# Patient Record
Sex: FEMALE | Race: WHITE | NOT HISPANIC OR LATINO | URBAN - METROPOLITAN AREA
[De-identification: names, ages, dates, MRNs, and addresses within clinical notes are randomized per-mention and may not be internally consistent; named-entity substitution may affect disease eponyms.]

---

## 2021-10-28 ENCOUNTER — OUTPATIENT (OUTPATIENT)
Dept: OUTPATIENT SERVICES | Facility: HOSPITAL | Age: 41
LOS: 1 days | Discharge: HOME | End: 2021-10-28

## 2021-10-28 DIAGNOSIS — S89.92XA UNSPECIFIED INJURY OF LEFT LOWER LEG, INITIAL ENCOUNTER: Chronic | ICD-10-CM

## 2021-10-28 DIAGNOSIS — K42.9 UMBILICAL HERNIA WITHOUT OBSTRUCTION OR GANGRENE: Chronic | ICD-10-CM

## 2021-10-28 DIAGNOSIS — Z90.49 ACQUIRED ABSENCE OF OTHER SPECIFIED PARTS OF DIGESTIVE TRACT: Chronic | ICD-10-CM

## 2021-10-28 DIAGNOSIS — C50.919 MALIGNANT NEOPLASM OF UNSPECIFIED SITE OF UNSPECIFIED FEMALE BREAST: ICD-10-CM

## 2021-10-28 DIAGNOSIS — Z98.89 OTHER SPECIFIED POSTPROCEDURAL STATES: Chronic | ICD-10-CM

## 2021-10-28 DIAGNOSIS — Z45.819 ENCOUNTER FOR ADJUSTMENT OR REMOVAL OF UNSPECIFIED BREAST IMPLANT: Chronic | ICD-10-CM

## 2021-11-01 ENCOUNTER — APPOINTMENT (OUTPATIENT)
Dept: HEMATOLOGY ONCOLOGY | Facility: CLINIC | Age: 41
End: 2021-11-01
Payer: COMMERCIAL

## 2021-11-10 ENCOUNTER — APPOINTMENT (OUTPATIENT)
Dept: HEMATOLOGY ONCOLOGY | Facility: CLINIC | Age: 41
End: 2021-11-10
Payer: COMMERCIAL

## 2021-11-10 VITALS
SYSTOLIC BLOOD PRESSURE: 117 MMHG | WEIGHT: 157 LBS | HEART RATE: 79 BPM | BODY MASS INDEX: 26.16 KG/M2 | HEIGHT: 65 IN | DIASTOLIC BLOOD PRESSURE: 81 MMHG | OXYGEN SATURATION: 97 %

## 2021-11-10 PROCEDURE — 99205 OFFICE O/P NEW HI 60 MIN: CPT

## 2021-11-10 RX ORDER — LEVOTHYROXINE SODIUM 200 UG/1
200 TABLET ORAL
Qty: 90 | Refills: 1 | Status: ACTIVE | COMMUNITY
Start: 2021-11-10

## 2021-11-10 RX ORDER — DULOXETINE HYDROCHLORIDE 60 MG/1
60 CAPSULE, DELAYED RELEASE PELLETS ORAL
Refills: 0 | Status: ACTIVE | COMMUNITY
Start: 2021-11-10

## 2021-11-10 RX ORDER — LEVOTHYROXINE SODIUM 25 UG/1
25 TABLET ORAL DAILY
Refills: 0 | Status: ACTIVE | COMMUNITY
Start: 2021-11-10

## 2021-11-10 RX ORDER — DENOSUMAB 60 MG/ML
60 INJECTION SUBCUTANEOUS
Refills: 0 | Status: ACTIVE | COMMUNITY
Start: 2021-11-10

## 2021-11-10 NOTE — ASSESSMENT
[FreeTextEntry1] : 40 year old female history of stage IA right breast cancer (pTcN0, ER 90% IN 80% Her2 negative) s/p bilateral mastectomy on 11/8/2013.  Started Tamoxifen in 2013, stopped taking Tamoxifen in 2017 due to side effects of emotional lability, total duration of Tamoxifen ~3 years. \par \par Today we reviewed her available radiographic and pathologic data in detail. We also discussed the natural history of the disease. She has fibromyalgia and has ongoing chronic MSK pain, no particular area that is worse than others.   She does have some intermittent pelvic pressure.   Discussed no role for routine imaging for surveillance as it has not shown survival benefit. \par \par Plan:\par Advised imaging would be symptom driven \par Pelvic pain: f/u with GYN\par Follow up in 6 months

## 2021-11-10 NOTE — CONSULT LETTER
[Dear  ___] : Dear  [unfilled], [Consult Letter:] : I had the pleasure of evaluating your patient, [unfilled]. [Please see my note below.] : Please see my note below. [Consult Closing:] : Thank you very much for allowing me to participate in the care of this patient.  If you have any questions, please do not hesitate to contact me. [Sincerely,] : Sincerely, [DrYusef  ___] : Dr. PEDERSEN [FreeTextEntry3] : Sedrick Herman MD\par Medical Oncology/Hematology\par [facility] [place], [facility]\par \par \par [facility] at [facility]\par

## 2021-11-10 NOTE — HISTORY OF PRESENT ILLNESS
[Disease: _____________________] : Disease: [unfilled] [T: ___] : T[unfilled] [N: ___] : N[unfilled] [AJCC Stage: ____] : AJCC Stage: [unfilled] [de-identified] : Ms. Stanley is a 40 year old female who was diagnosed with right breast IDC ER 90% GA 80% Her2 negative in 2013 at age 32.\par \par Patient experienced right breast pain which prompted breast imaging demonstrating right breast mass. Underwent bilateral mastectomy on 11/8/2013 which demonstrated right breast IDC, grade 1, measuring 1.1cm in greatest dimension ER 90% GA 80% Her2 negative. IDC is 2cm from the closest margin of excision. No lymphovascular space invasion is present. DCIS, low nuclear grade associated with calcifications but no necrosis. 0/1 lymph nodes negative. pT1cN0\par \par Previous oncologist was Dr. Sellers at [facility] then f/u with Dr. Cherelle Hess then stopped following with oncology altogether. \par Recalls genetic testing at [facility] in 2013 did not reveal any genetic mutations \par Started Tamoxifen immediately following b/l mastectomy in 2013, stopped taking Tamoxifen in 2017 due to side effects of emotional lability and outbursts\par Notes right femur fracture in 2014 requiring surgery\par Reports she has fibromyalgia. Has intermittent episodes of bilateral clavicle, neck, bilateral hips or bilateral pelvic pain radiating to ankles. Notes pain is worse after hiking, receives massage therapy and f/u with chiropractor \par Has intermittent pelvic pain, pressure stabbing like pain, ranges from mild- severe, self resolves\par Has full ROM of arms. \par Menstrual cycle is regular, last GYN exam 2019\par No recent weight loss\par Last colonoscopy was 9 years ago. Underwent recent EGD due to GERD in [place], cannot recall physicians name\par \par PMH: Hypothyroidism (born without thyroid gland), Osteopenia (DEXA on 11/2/2015), Fibromyalgia\par SH: Bilateral mastectomy (2013), Right femur fracture repair\par FH: Denies family history of cancer\par PCP: Dr. Farshad Baum  [de-identified] : IDC, grade 1 [de-identified] : ER 90% WA 80% Her2 negative

## 2021-11-10 NOTE — PHYSICAL EXAM
[Normal] : affect appropriate [de-identified] : supple [de-identified] : bilateral reconstructed breasts

## 2021-11-16 ENCOUNTER — RESULT REVIEW (OUTPATIENT)
Age: 41
End: 2021-11-16

## 2021-11-17 LAB — SURGICAL PATHOLOGY STUDY: SIGNIFICANT CHANGE UP

## 2022-05-04 ENCOUNTER — OUTPATIENT (OUTPATIENT)
Dept: OUTPATIENT SERVICES | Facility: HOSPITAL | Age: 42
LOS: 1 days | Discharge: HOME | End: 2022-05-04

## 2022-05-04 DIAGNOSIS — K42.9 UMBILICAL HERNIA WITHOUT OBSTRUCTION OR GANGRENE: Chronic | ICD-10-CM

## 2022-05-04 DIAGNOSIS — S89.92XA UNSPECIFIED INJURY OF LEFT LOWER LEG, INITIAL ENCOUNTER: Chronic | ICD-10-CM

## 2022-05-04 DIAGNOSIS — C50.919 MALIGNANT NEOPLASM OF UNSPECIFIED SITE OF UNSPECIFIED FEMALE BREAST: ICD-10-CM

## 2022-05-04 DIAGNOSIS — Z98.89 OTHER SPECIFIED POSTPROCEDURAL STATES: Chronic | ICD-10-CM

## 2022-05-04 DIAGNOSIS — Z90.49 ACQUIRED ABSENCE OF OTHER SPECIFIED PARTS OF DIGESTIVE TRACT: Chronic | ICD-10-CM

## 2022-05-04 DIAGNOSIS — Z45.819 ENCOUNTER FOR ADJUSTMENT OR REMOVAL OF UNSPECIFIED BREAST IMPLANT: Chronic | ICD-10-CM

## 2022-05-10 ENCOUNTER — APPOINTMENT (OUTPATIENT)
Dept: HEMATOLOGY ONCOLOGY | Facility: CLINIC | Age: 42
End: 2022-05-10
Payer: COMMERCIAL

## 2022-05-10 VITALS
SYSTOLIC BLOOD PRESSURE: 107 MMHG | BODY MASS INDEX: 26.41 KG/M2 | DIASTOLIC BLOOD PRESSURE: 75 MMHG | HEART RATE: 80 BPM | OXYGEN SATURATION: 96 % | WEIGHT: 158.5 LBS | HEIGHT: 65 IN

## 2022-05-10 DIAGNOSIS — C50.919 MALIGNANT NEOPLASM OF UNSPECIFIED SITE OF UNSPECIFIED FEMALE BREAST: ICD-10-CM

## 2022-05-10 PROCEDURE — 99214 OFFICE O/P EST MOD 30 MIN: CPT

## 2022-05-10 RX ORDER — GABAPENTIN 300 MG/1
300 CAPSULE ORAL
Refills: 0 | Status: ACTIVE | COMMUNITY

## 2022-05-10 NOTE — ADDENDUM
[FreeTextEntry1] : Documented by Christopher Kennedy acting as scribe for Dr. Herman on 05/10/2022. \par \par All Medical record entries made by the Scribe were at my, Dr. Herman's, direction and personally dictated by me on 05/10/2022. I have reviewed the chart and agree that the record accurately reflects my personal performance of the history, physical exam, assessment and plan. I have also personally directed, reviewed, and agreed with the discharge instructions.

## 2022-05-10 NOTE — CONSULT LETTER
[Dear  ___] : Dear  [unfilled], [Consult Letter:] : I had the pleasure of evaluating your patient, [unfilled]. [Please see my note below.] : Please see my note below. [Consult Closing:] : Thank you very much for allowing me to participate in the care of this patient.  If you have any questions, please do not hesitate to contact me. [Sincerely,] : Sincerely, [DrYusef  ___] : Dr. PEDERSEN [FreeTextEntry3] : Sedrick Herman MD\par Medical Oncology/Hematology\par [facility] [place], [facility]\par \par \par [facility] at [facility], [place], operated by [facility]\par

## 2022-05-10 NOTE — HISTORY OF PRESENT ILLNESS
[Disease: _____________________] : Disease: [unfilled] [T: ___] : T[unfilled] [N: ___] : N[unfilled] [AJCC Stage: ____] : AJCC Stage: [unfilled] [de-identified] : Ms. Stanley is a 40 year old female who was diagnosed with right breast IDC ER 90% MD 80% Her2 negative in 2013 at age 32.\par \par Patient experienced right breast pain which prompted breast imaging demonstrating right breast mass. Underwent bilateral mastectomy on 11/8/2013 which demonstrated right breast IDC, grade 1, measuring 1.1cm in greatest dimension ER 90% MD 80% Her2 negative. IDC is 2cm from the closest margin of excision. No lymphovascular space invasion is present. DCIS, low nuclear grade associated with calcifications but no necrosis. 0/1 lymph nodes negative. pT1cN0\par \par Previous oncologist was Dr. Sellers at [facility] then f/u with Dr. Cherelle Hess then stopped following with oncology altogether. \par Recalls genetic testing at [facility] in 2013 did not reveal any genetic mutations \par Started Tamoxifen immediately following b/l mastectomy in 2013, stopped taking Tamoxifen in 2017 due to side effects of emotional lability and outbursts\par Notes right femur fracture in 2014 requiring surgery\par Reports she has fibromyalgia. Has intermittent episodes of bilateral clavicle, neck, bilateral hips or bilateral pelvic pain radiating to ankles. Notes pain is worse after hiking, receives massage therapy and f/u with chiropractor \par Has intermittent pelvic pain, pressure stabbing like pain, ranges from mild- severe, self resolves\par Has full ROM of arms. \par Menstrual cycle is regular, last GYN exam 2019\par No recent weight loss\par Last colonoscopy was 9 years ago. Underwent recent EGD due to GERD in [place], cannot recall physicians name\par \par PMH: Hypothyroidism (born without thyroid gland), Osteopenia (DEXA on 11/2/2015), Fibromyalgia\par SH: Bilateral mastectomy (2013), Right femur fracture repair\par FH: Denies family history of cancer\par PCP: Dr. Farshad Baum  [de-identified] : IDC, grade 1 [de-identified] : ER 90% IN 80% Her2 negative [de-identified] : Patient returns for follow-up. \par Stable weight \par No bone pain \par On Gabapentin as per Rheumatologist \par Regular periods. Last follow-up with GYN was last year. \par \par

## 2022-05-10 NOTE — PHYSICAL EXAM
[Normal] : affect appropriate [de-identified] : supple [de-identified] : bilateral reconstructed breasts, no masses or axillary adenopathy

## 2022-05-25 ENCOUNTER — APPOINTMENT (OUTPATIENT)
Dept: ORTHOPEDIC SURGERY | Facility: CLINIC | Age: 42
End: 2022-05-25

## 2022-11-03 ENCOUNTER — OUTPATIENT (OUTPATIENT)
Dept: OUTPATIENT SERVICES | Facility: HOSPITAL | Age: 42
LOS: 1 days | Discharge: HOME | End: 2022-11-03

## 2022-11-03 DIAGNOSIS — S89.92XA UNSPECIFIED INJURY OF LEFT LOWER LEG, INITIAL ENCOUNTER: Chronic | ICD-10-CM

## 2022-11-03 DIAGNOSIS — Z90.49 ACQUIRED ABSENCE OF OTHER SPECIFIED PARTS OF DIGESTIVE TRACT: Chronic | ICD-10-CM

## 2022-11-03 DIAGNOSIS — Z45.819 ENCOUNTER FOR ADJUSTMENT OR REMOVAL OF UNSPECIFIED BREAST IMPLANT: Chronic | ICD-10-CM

## 2022-11-03 DIAGNOSIS — C50.919 MALIGNANT NEOPLASM OF UNSPECIFIED SITE OF UNSPECIFIED FEMALE BREAST: ICD-10-CM

## 2022-11-03 DIAGNOSIS — K42.9 UMBILICAL HERNIA WITHOUT OBSTRUCTION OR GANGRENE: Chronic | ICD-10-CM

## 2022-11-03 DIAGNOSIS — Z98.89 OTHER SPECIFIED POSTPROCEDURAL STATES: Chronic | ICD-10-CM

## 2022-11-08 ENCOUNTER — TRANSCRIPTION ENCOUNTER (OUTPATIENT)
Age: 42
End: 2022-11-08

## 2022-11-09 ENCOUNTER — APPOINTMENT (OUTPATIENT)
Dept: HEMATOLOGY ONCOLOGY | Facility: CLINIC | Age: 42
End: 2022-11-09

## 2023-03-15 ENCOUNTER — NON-APPOINTMENT (OUTPATIENT)
Age: 43
End: 2023-03-15

## 2023-04-08 ENCOUNTER — OFFICE (OUTPATIENT)
Dept: URBAN - METROPOLITAN AREA CLINIC 104 | Facility: CLINIC | Age: 43
Setting detail: OPHTHALMOLOGY
End: 2023-04-08
Payer: COMMERCIAL

## 2023-04-08 DIAGNOSIS — H01.002: ICD-10-CM

## 2023-04-08 DIAGNOSIS — H01.004: ICD-10-CM

## 2023-04-08 DIAGNOSIS — H52.4: ICD-10-CM

## 2023-04-08 DIAGNOSIS — H01.005: ICD-10-CM

## 2023-04-08 DIAGNOSIS — H01.001: ICD-10-CM

## 2023-04-08 PROBLEM — H52.213 IRREGULAR ASTIGMATISM; BOTH EYES: Status: ACTIVE | Noted: 2023-04-08

## 2023-04-08 PROCEDURE — 92014 COMPRE OPH EXAM EST PT 1/>: CPT | Performed by: OPTOMETRIST

## 2023-04-08 PROCEDURE — 92015 DETERMINE REFRACTIVE STATE: CPT | Performed by: OPTOMETRIST

## 2023-04-08 ASSESSMENT — REFRACTION_CURRENTRX
OS_VPRISM_DIRECTION: SV
OS_SPHERE: -0.50
OD_OVR_VA: 20/
OD_AXIS: 022
OD_CYLINDER: -3.75
OS_CYLINDER: -2.75
OS_AXIS: 174
OS_OVR_VA: 20/
OD_SPHERE: +2.25
OD_VPRISM_DIRECTION: SV

## 2023-04-08 ASSESSMENT — REFRACTION_MANIFEST
OS_ADD: +1.75
OD_VA1: 20/25
OS_VA1: 20/20-1
OD_SPHERE: +3.25
OU_VA: 20/20-
OD_AXIS: 025
OD_CYLINDER: -4.75
OS_SPHERE: -0.25
OS_AXIS: 180
OD_ADD: +1.75
OS_CYLINDER: -2.25

## 2023-04-08 ASSESSMENT — CONFRONTATIONAL VISUAL FIELD TEST (CVF)
OS_FINDINGS: FULL
OD_FINDINGS: FULL

## 2023-04-08 ASSESSMENT — AXIALLENGTH_DERIVED
OD_AL: 23.21
OS_AL: 24.1025
OS_AL: 24.26
OD_AL: 23.45

## 2023-04-08 ASSESSMENT — KERATOMETRY
OS_K2POWER_DIOPTERS: 44.00
OS_K1POWER_DIOPTERS: 42.40
OD_K1POWER_DIOPTERS: 41.06
OD_K2POWER_DIOPTERS: 44.88
OS_AXISANGLE_DEGREES: 080
OD_AXISANGLE_DEGREES: 110

## 2023-04-08 ASSESSMENT — REFRACTION_AUTOREFRACTION
OD_CYLINDER: -4.50
OD_AXIS: 024
OS_AXIS: 176
OS_SPHERE: 0.00
OD_SPHERE: +3.75
OS_CYLINDER: -2.00

## 2023-04-08 ASSESSMENT — SPHEQUIV_DERIVED
OS_SPHEQUIV: -1
OS_SPHEQUIV: -1.375
OD_SPHEQUIV: 0.875
OD_SPHEQUIV: 1.5

## 2023-04-08 ASSESSMENT — TONOMETRY
OD_IOP_MMHG: 18
OS_IOP_MMHG: 18

## 2023-04-08 ASSESSMENT — VISUAL ACUITY
OS_BCVA: 20/25-2
OD_BCVA: 20/25+2

## 2023-04-08 ASSESSMENT — LID EXAM ASSESSMENTS
OS_BLEPHARITIS: LLL LUL 1+ 2+
OD_BLEPHARITIS: RLL RUL 1+ 2+

## 2023-04-25 ENCOUNTER — APPOINTMENT (OUTPATIENT)
Dept: OBGYN | Facility: CLINIC | Age: 43
End: 2023-04-25
Payer: COMMERCIAL

## 2023-04-25 VITALS
TEMPERATURE: 97.9 F | RESPIRATION RATE: 14 BRPM | HEIGHT: 64 IN | HEART RATE: 76 BPM | WEIGHT: 160 LBS | BODY MASS INDEX: 27.31 KG/M2 | SYSTOLIC BLOOD PRESSURE: 121 MMHG | DIASTOLIC BLOOD PRESSURE: 74 MMHG | OXYGEN SATURATION: 98 %

## 2023-04-25 DIAGNOSIS — Z12.39 ENCOUNTER FOR OTHER SCREENING FOR MALIGNANT NEOPLASM OF BREAST: ICD-10-CM

## 2023-04-25 DIAGNOSIS — Z01.419 ENCOUNTER FOR GYNECOLOGICAL EXAMINATION (GENERAL) (ROUTINE) W/OUT ABNORMAL FINDINGS: ICD-10-CM

## 2023-04-25 PROCEDURE — 99386 PREV VISIT NEW AGE 40-64: CPT

## 2023-04-25 NOTE — HISTORY OF PRESENT ILLNESS
[FreeTextEntry1] : HPI: Patient is a 43yo female presenting for her well woman exam. \par Patient is without complaints today.\par \par ROS: neg unless specified in HPI\par \par OB Hx: \par  x 1\par C-sec of twins x 1\par SAB x 1\par \par Gyn Hx: \par Menses: monthly\par + h/o ovarian cysts\par Pap: none in EMR\par Contraception: vasectomy\par Breast: h/o breast CA s/p bilateral mastectomy and reconstruction\par \par PMH: asthma, gerd, anxiety, hypothyroidism, fibromyalgia, ibs\par PSH: C-sec x 2, bilateral mastectomy with reconstruction\par Meds: synthroid 225\par All: PCN, ASA, Demerol, Avelox\par FH: Denies any breast, gynecologic, or GI cancers in the family\par SH: neg x 3\par \par Additional Exam: \par \par Gyn: Normal appearing external genitalia, normal appearing vagina and cervix, no CMT, bimanual with normal sized mobile uterus, no fixed adnexal masses or tenderness\par \par Breast: No lymphadenopathy in the neck, chest wall, bilateral supraclavicular, infraclavicular, and bilateral axillary areas. \par No overt asymmetry in bilateral breast contour with normal appearing skin. \par \par A/P: 43yo female here for annual exam\par Gyn Screening:\par - Pap: cytology + automatic HR HPV sent\par - STI screening: patient declined\par \par Breast Screening: breast pain and possible bilateral masses since March\par - Discussed self-breast exam\par - Clinical breast exam performed\par - MRI and breast US ordered, no mammogram due to pain with implants\par - breast surgery consult placed\par \par AHM: CV, Pulmonary, Endocrine, GI, Bone Screening, Vitamin supplementation, and Immunizations with PCP\par RTC 1 year for annual well woman exam\par CARLO Gonzalez MD\par

## 2023-04-26 LAB — HPV HIGH+LOW RISK DNA PNL CVX: NOT DETECTED

## 2023-05-02 LAB — CYTOLOGY CVX/VAG DOC THIN PREP: NORMAL
